# Patient Record
(demographics unavailable — no encounter records)

---

## 2025-03-28 NOTE — REVIEW OF SYSTEMS
[Fever] : no fever [Fatigue] : no fatigue [Discharge] : no discharge [Earache] : no earache [Sore Throat] : no sore throat [Chest Pain] : no chest pain [Palpitations] : no palpitations [Shortness Of Breath] : no shortness of breath [Wheezing] : no wheezing [Cough] : no cough [Abdominal Pain] : no abdominal pain [Constipation] : no constipation [Diarrhea] : diarrhea [Dysuria] : no dysuria [Headache] : no headache

## 2025-03-28 NOTE — PHYSICAL EXAM
[No Acute Distress] : no acute distress [Well Nourished] : well nourished [Well Developed] : well developed [Well-Appearing] : well-appearing [Normal Sclera/Conjunctiva] : normal sclera/conjunctiva [PERRL] : pupils equal round and reactive to light [EOMI] : extraocular movements intact [Normal Oropharynx] : the oropharynx was normal [Supple] : supple [No Respiratory Distress] : no respiratory distress  [Clear to Auscultation] : lungs were clear to auscultation bilaterally [Normal Rate] : normal rate  [Normal S1, S2] : normal S1 and S2 [Soft] : abdomen soft [Non Tender] : non-tender [Non-distended] : non-distended [Normal Bowel Sounds] : normal bowel sounds [Speech Grossly Normal] : speech grossly normal [Normal Affect] : the affect was normal [Normal Mood] : the mood was normal

## 2025-03-28 NOTE — PLAN
[FreeTextEntry1] : 33-year-old female for new patient physical exam.  Pap per GYN.  Labs as ordered.  Lifestyle modifications discussed.  All questions answered. Patient voiced understanding and in agreement to plan.  Return to clinic as recommended

## 2025-03-28 NOTE — HEALTH RISK ASSESSMENT
[Yes] : Yes [Monthly or less (1 pt)] : Monthly or less (1 point) [1 or 2 (0 pts)] : 1 or 2 (0 points) [Never (0 pts)] : Never (0 points) [0] : 2) Feeling down, depressed, or hopeless: Not at all (0) [Patient reported PAP Smear was normal] : Patient reported PAP Smear was normal [Never] : Never [Audit-CScore] : 1 [EyeExamDate] : 00/2023 [PapSmearDate] : 05/2024

## 2025-03-28 NOTE — HISTORY OF PRESENT ILLNESS
[FreeTextEntry1] : NPPE [de-identified] : 33-year-old female for new patient physical exam. Doing well. Lives with her . Works as a

## 2025-06-09 NOTE — PHYSICAL EXAM
[Chaperone Declined] : Chaperone offered however refused by patient, [Appropriately responsive] : appropriately responsive [Alert] : alert [No Acute Distress] : no acute distress [No Lymphadenopathy] : no lymphadenopathy [Regular Rate Rhythm] : regular rate rhythm [No Murmurs] : no murmurs [Clear to Auscultation B/L] : clear to auscultation bilaterally [Soft] : soft [Non-tender] : non-tender [Non-distended] : non-distended [No HSM] : No HSM [No Lesions] : no lesions [Oriented x3] : oriented x3 [No Mass] : no mass [Examination Of The Breasts] : a normal appearance [No Masses] : no breast masses were palpable [Labia Majora] : normal [Labia Minora] : normal [Normal] : normal [Uterine Adnexae] : normal

## 2025-06-09 NOTE — HISTORY OF PRESENT ILLNESS
[FreeTextEntry1] : 34 yo here for av, recently had a chemical pregnancey her levels are now 1.  SHe will continue trying to concieve.  MOm had ovarian cancer. last year dx her mom is going to  [facility] [place] and had all of the gene studies and that is neg.  Pt has some itching did start using a new pad [Currently Active] : currently active [Men] : men

## 2025-06-09 NOTE — PLAN
[FreeTextEntry1] : change pad back to previous brand, some steroid cream for that  disc dates and trying to conceive. prenatals  1-encouraged pt to exercise 2.5 hours minimally a week as per the [facility] , 2- monitor her cycles,  3-to take calcium foods equalling 800 mg daily and vitamin D 1000 IU daily and fish oil or flax seed oil for omega 3s. 4- REturn in one yr for a visit. questions answered.  5-Encouraged SBE